# Patient Record
Sex: MALE
[De-identification: names, ages, dates, MRNs, and addresses within clinical notes are randomized per-mention and may not be internally consistent; named-entity substitution may affect disease eponyms.]

---

## 2024-08-07 ENCOUNTER — APPOINTMENT (OUTPATIENT)
Dept: ORTHOPEDIC SURGERY | Facility: CLINIC | Age: 64
End: 2024-08-07

## 2024-08-07 PROBLEM — Z00.00 ENCOUNTER FOR PREVENTIVE HEALTH EXAMINATION: Status: ACTIVE | Noted: 2024-05-01

## 2024-08-07 PROBLEM — Z78.9 NO PERTINENT FAMILY HISTORY: Status: ACTIVE | Noted: 2024-08-07

## 2024-08-07 PROBLEM — Z78.9 NO PERTINENT PAST MEDICAL HISTORY: Status: RESOLVED | Noted: 2024-08-07 | Resolved: 2024-08-07

## 2024-08-07 PROCEDURE — 99203 OFFICE O/P NEW LOW 30 MIN: CPT

## 2024-08-07 PROCEDURE — 73610 X-RAY EXAM OF ANKLE: CPT | Mod: LT

## 2024-08-07 NOTE — IMAGING
[Left] : left ankle [Weight -] : weightbearing [Tibio-talar degenerative changes] : Tibio-talar degenerative changes [FreeTextEntry9] : anterior bony impingement

## 2024-08-07 NOTE — PHYSICAL EXAM
[Left] : left foot and ankle [] : patient ambulates without assistive device [TWNoteComboBox7] : dorsiflexion 0 degrees [de-identified] : plantar flexion 30 degrees

## 2024-08-07 NOTE — HISTORY OF PRESENT ILLNESS
[Gradual] : gradual [7] : 7 [4] : 4 [Burning] : burning [Localized] : localized [Shooting] : shooting [Stabbing] : stabbing [Constant] : constant [Work] : work [Full time] : Work status: full time [de-identified] : 8/7/24  L ankle pain for years, no recent injury. Had good result in past to visco [] : no [FreeTextEntry1] : l ankle [FreeTextEntry5] : left ankle pain for years , no recent injury.  [FreeTextEntry9] : injections in the past,elevation  [de-identified] : driving [de-identified] : Dr. James

## 2024-08-21 ENCOUNTER — APPOINTMENT (OUTPATIENT)
Dept: ORTHOPEDIC SURGERY | Facility: CLINIC | Age: 64
End: 2024-08-21
Payer: COMMERCIAL

## 2024-08-21 VITALS — BODY MASS INDEX: 25.9 KG/M2 | HEIGHT: 71 IN | WEIGHT: 185 LBS

## 2024-08-21 PROBLEM — M19.072 ARTHRITIS OF LEFT ANKLE: Status: ACTIVE | Noted: 2024-08-07

## 2024-08-21 PROCEDURE — 99024 POSTOP FOLLOW-UP VISIT: CPT

## 2024-08-21 NOTE — HISTORY OF PRESENT ILLNESS
[Full time] : Work status: full time [1] : 1 [Other:____] : [unfilled] [de-identified] : 8/7/24  L ankle pain for years, no recent injury. Had good result in past to visco 8/21/24  Visco# L ankle [Gradual] : gradual [7] : 7 [4] : 4 [Burning] : burning [Localized] : localized [Shooting] : shooting [Stabbing] : stabbing [Constant] : constant [Work] : work [] : no [FreeTextEntry1] : l ankle [FreeTextEntry5] : left ankle pain for years , no recent injury.  [FreeTextEntry9] : injections in the past,elevation  [de-identified] : driving [de-identified] : Dr. James [de-identified] : none

## 2024-08-21 NOTE — REVIEW OF SYSTEMS
[Joint Pain] : joint pain [Joint Stiffness] : joint stiffness [Joint Swelling] : joint swelling [Negative] : Heme/Lymph [FreeTextEntry9] : lt knee

## 2024-08-21 NOTE — PHYSICAL EXAM
[Left] : left foot and ankle [] : patient ambulates without assistive device [TWNoteComboBox7] : dorsiflexion 0 degrees [de-identified] : plantar flexion 30 degrees

## 2024-08-21 NOTE — PROCEDURE
[Medium Joint Injection] : medium joint injection [Left] : of the left [Ankle Joint] : ankle joint [Pain] : pain [Repeat series performed] : repeat series performed [Alcohol] : alcohol [Sterile technique used] : sterile technique used [Apply ice for 15min out of every hour for the next 12-24 hours as tolerated] : apply ice for 15 minutes out of every hour for the next 12-24 hours as tolerated [Risks, benefits, alternatives discussed / Verbal consent obtained] : the risks benefits, and alternatives have been discussed, and verbal consent was obtained

## 2024-08-28 ENCOUNTER — APPOINTMENT (OUTPATIENT)
Dept: ORTHOPEDIC SURGERY | Facility: CLINIC | Age: 64
End: 2024-08-28
Payer: COMMERCIAL

## 2024-08-28 VITALS — WEIGHT: 185 LBS | BODY MASS INDEX: 25.9 KG/M2 | HEIGHT: 71 IN

## 2024-08-28 PROCEDURE — 99024 POSTOP FOLLOW-UP VISIT: CPT

## 2024-08-28 NOTE — DISCUSSION/SUMMARY
[de-identified] : Discussed visco, timing and frequency of repeat injection/series, ostectomies/home, taa

## 2024-08-28 NOTE — PHYSICAL EXAM
[Left] : left foot and ankle [] : no calf tenderness [TWNoteComboBox7] : dorsiflexion 0 degrees [de-identified] : plantar flexion 30 degrees

## 2024-08-28 NOTE — PROCEDURE
[Medium Joint Injection] : medium joint injection [Left] : of the left [Ankle Joint] : ankle joint [Pain] : pain [Inflammation] : inflammation [X-ray evidence of Osteoarthritis on this or prior visit] : x-ray evidence of Osteoarthritis on this or prior visit [Alcohol] : alcohol [Ethyl Chloride sprayed topically] : ethyl chloride sprayed topically [Sterile technique used] : sterile technique used [Visco-3 (25mg)] : 25mg of Visco-3 [] : Patient tolerated procedure well [Call if redness, pain or fever occur] : call if redness, pain or fever occur [Apply ice for 15min out of every hour for the next 12-24 hours as tolerated] : apply ice for 15 minutes out of every hour for the next 12-24 hours as tolerated [#3] : series #3

## 2024-08-28 NOTE — HISTORY OF PRESENT ILLNESS
[Gradual] : gradual [7] : 7 [4] : 4 [Localized] : localized [Constant] : constant [Work] : work [Full time] : Work status: full time [2] : 2 [Other:____] : [unfilled] [de-identified] : 8/7/24  L ankle pain for years, no recent injury. Had good result in past to visco 8/21/24  Visco# L ankle 8/28/24  L ankle for Visco-3 #2.  No new issues.  [] : no [FreeTextEntry1] : l ankle [FreeTextEntry5] : left ankle pain for years , no recent injury.  [FreeTextEntry9] : injections in the past,elevation  [de-identified] : driving [de-identified] : 8-21-24 [de-identified] : l ankle

## 2024-09-04 ENCOUNTER — APPOINTMENT (OUTPATIENT)
Dept: ORTHOPEDIC SURGERY | Facility: CLINIC | Age: 64
End: 2024-09-04
Payer: COMMERCIAL

## 2024-09-04 VITALS — HEIGHT: 71 IN | BODY MASS INDEX: 25.9 KG/M2 | WEIGHT: 185 LBS

## 2024-09-04 DIAGNOSIS — M19.072 PRIMARY OSTEOARTHRITIS, LEFT ANKLE AND FOOT: ICD-10-CM

## 2024-09-04 PROCEDURE — 99024 POSTOP FOLLOW-UP VISIT: CPT

## 2024-09-04 NOTE — DISCUSSION/SUMMARY
[de-identified] : Discussed visco, timing and frequency of repeat injection/series, ostectomies/home, taa

## 2024-09-04 NOTE — PHYSICAL EXAM
[Left] : left foot and ankle [] : no calf tenderness [TWNoteComboBox7] : dorsiflexion 0 degrees [de-identified] : plantar flexion 30 degrees

## 2024-09-04 NOTE — HISTORY OF PRESENT ILLNESS
[Gradual] : gradual [7] : 7 [4] : 4 [Localized] : localized [Constant] : constant [Work] : work [Full time] : Work status: full time [3] : 3 [Other:____] : [unfilled] [Nothing helps with pain getting better] : Nothing helps with pain getting better [de-identified] : 8/7/24  L ankle pain for years, no recent injury. Had good result in past to visco 8/21/24  Visco# L ankle 8/28/24  L ankle for Visco-3 #2.  No new issues.  9/4/234: L ankle for Visco-3 #3 [] : no [FreeTextEntry1] : l ankle [FreeTextEntry5] : left ankle pain for years , no recent injury.  [de-identified] : driving [de-identified] : 8-28-24 [de-identified] : l ankle

## 2024-09-04 NOTE — PROCEDURE
[Medium Joint Injection] : medium joint injection [Left] : of the left [Ankle Joint] : ankle joint [Pain] : pain [Inflammation] : inflammation [X-ray evidence of Osteoarthritis on this or prior visit] : x-ray evidence of Osteoarthritis on this or prior visit [Alcohol] : alcohol [Ethyl Chloride sprayed topically] : ethyl chloride sprayed topically [Sterile technique used] : sterile technique used [Visco-3 (25mg)] : 25mg of Visco-3 [#3] : series #3 [] : Patient tolerated procedure well [Call if redness, pain or fever occur] : call if redness, pain or fever occur [Apply ice for 15min out of every hour for the next 12-24 hours as tolerated] : apply ice for 15 minutes out of every hour for the next 12-24 hours as tolerated

## 2025-09-03 ENCOUNTER — APPOINTMENT (OUTPATIENT)
Dept: ORTHOPEDIC SURGERY | Facility: CLINIC | Age: 65
End: 2025-09-03
Payer: COMMERCIAL

## 2025-09-03 ENCOUNTER — APPOINTMENT (OUTPATIENT)
Dept: ORTHOPEDIC SURGERY | Facility: CLINIC | Age: 65
End: 2025-09-03

## 2025-09-03 VITALS — BODY MASS INDEX: 25.9 KG/M2 | WEIGHT: 185 LBS | HEIGHT: 71 IN

## 2025-09-03 DIAGNOSIS — M19.072 PRIMARY OSTEOARTHRITIS, LEFT ANKLE AND FOOT: ICD-10-CM

## 2025-09-03 PROCEDURE — 73610 X-RAY EXAM OF ANKLE: CPT | Mod: LT

## 2025-09-03 PROCEDURE — 99213 OFFICE O/P EST LOW 20 MIN: CPT

## 2025-09-03 RX ORDER — SODIUM HYALURONATE INTRA-ARTICULAR SOLN PREF SYR 25 MG/2.5ML 25/2.5 MG/ML
25 SOLUTION PREFILLED SYRINGE INTRAARTICULAR
Qty: 5 | Refills: 0 | Status: ACTIVE | COMMUNITY
Start: 2025-09-03 | End: 1900-01-01

## 2025-09-10 ENCOUNTER — APPOINTMENT (OUTPATIENT)
Dept: ORTHOPEDIC SURGERY | Facility: CLINIC | Age: 65
End: 2025-09-10

## 2025-09-17 ENCOUNTER — APPOINTMENT (OUTPATIENT)
Dept: ORTHOPEDIC SURGERY | Facility: CLINIC | Age: 65
End: 2025-09-17